# Patient Record
Sex: MALE
[De-identification: names, ages, dates, MRNs, and addresses within clinical notes are randomized per-mention and may not be internally consistent; named-entity substitution may affect disease eponyms.]

---

## 2024-09-16 PROBLEM — Z00.00 ENCOUNTER FOR PREVENTIVE HEALTH EXAMINATION: Status: ACTIVE | Noted: 2024-09-16

## 2024-10-01 ENCOUNTER — APPOINTMENT (OUTPATIENT)
Dept: NEUROSURGERY | Facility: CLINIC | Age: 64
End: 2024-10-01
Payer: MEDICARE

## 2024-10-01 DIAGNOSIS — H93.A9 PULSATILE TINNITUS, UNSPECIFIED EAR: ICD-10-CM

## 2024-10-01 PROCEDURE — 99202 OFFICE O/P NEW SF 15 MIN: CPT

## 2024-10-01 NOTE — REASON FOR VISIT
[New Patient Visit] : a new patient visit [Home] : at home, [unfilled] , at the time of the visit. [Medical Office: ([facility])___] : at the medical office located in  [Patient] : the patient [Self] : self [FreeTextEntry1] : Pulsatile Tinnitus

## 2024-10-01 NOTE — ASSESSMENT
[FreeTextEntry1] : Impression: Left Pulsatile and Non-Pulsatile Tinnitus Never tried neck pressure Left Ear hearing loss - wears hearing aid No Headaches or Vision Complaints  We discussed possible causes of pulsatile tinnitus including: ENT causes such as semicircular canal dehiscence, tumor, ototoxicity Cardiac causes such as aortic stenosis, valve disease, HTN, other causes of heart murmur Anemia Thyroid disease Cerebrovascular causes such as arteriovenous malformation (AVM), dural arteriovenous fistula (dAVF), venous sinus stenosis, venous aneurysm, large trans-mastoid emissary vein, carotid stenosis, jugular bulb diverticulum   MRA Neck 7/2024 reveals no carotid stenosis or dissection MRA Head 6/2024 reveals no dural AVF or AVM 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 Chen that there is no dangerous cause of pulsatile tinnitus seen on his MRA.  It is likely that the left sigmoid sinus diverticulum is the cause of the pulsatile tinnitus.  I recommend dedicated imaging of the venous system for further evaluation.  The non-pulsatile tinnitus he hears is likely related to an ENT cause/his hearing loss.    Plan: Try Ipsilateral Neck Pressure CTA Head and Neck w/wo (including venous phase) Follow Up with Me After Imaging

## 2024-10-01 NOTE — HISTORY OF PRESENT ILLNESS
[de-identified] : Mr. VIEYRA is a pleasant 64 year old male who presents today with a chief complaint of LEFT ear pulsatile tinnitus.  It first started in 2023 while weaning off from Paxil.  He had severe vertigo and bilateral tinnitus (ringing/buzzing) for 5 months.  He restarted the paxil and that improved his symptoms, but the left ear hissing remained.  He has occasional episodes of pulsatile, whooshing sound in sync with his pulse.  The pulsatile sound is provoked by anxiety/exertion.    He has been seen by ENT in the past and was noted to have left ear hearing loss.  He is wearing hearing aids which does not decrease the sound but helps mask it.

## 2024-11-12 ENCOUNTER — APPOINTMENT (OUTPATIENT)
Dept: NEUROSURGERY | Facility: CLINIC | Age: 64
End: 2024-11-12